# Patient Record
Sex: MALE | Race: WHITE
[De-identification: names, ages, dates, MRNs, and addresses within clinical notes are randomized per-mention and may not be internally consistent; named-entity substitution may affect disease eponyms.]

---

## 2020-11-16 ENCOUNTER — HOSPITAL ENCOUNTER (EMERGENCY)
Dept: HOSPITAL 9 - MADERS | Age: 45
Discharge: HOME | End: 2020-11-16
Payer: COMMERCIAL
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
## 2020-11-16 DIAGNOSIS — M25.572: Primary | ICD-10-CM

## 2020-11-16 NOTE — RAD
RADIOGRAPH LEFT ANKLE 3 VIEWS:



DATE:

11/16/2020



HISTORY:

45-year-old male with lateral left ankle pain for 2 weeks



FINDINGS:

Ankle mortise is congruent. There is no evidence of fracture. There is no subluxation or dislocation.
 There are no degenerative changes. Talar dome is maintained. There is lateral soft tissue

swelling.



IMPRESSION:



1. No osseous abnormality.

2. Edema at the lateral aspect of ankle



Reported By: Jb Meza 

Electronically Signed:  11/16/2020 7:06 PM

## 2020-11-30 ENCOUNTER — HOSPITAL ENCOUNTER (EMERGENCY)
Dept: HOSPITAL 9 - MADERS | Age: 45
Discharge: HOME | End: 2020-11-30
Payer: COMMERCIAL

## 2020-11-30 DIAGNOSIS — F17.210: ICD-10-CM

## 2020-11-30 DIAGNOSIS — J00: Primary | ICD-10-CM

## 2020-11-30 DIAGNOSIS — Z20.828: ICD-10-CM

## 2020-11-30 PROCEDURE — 99283 EMERGENCY DEPT VISIT LOW MDM: CPT

## 2020-11-30 PROCEDURE — U0003 INFECTIOUS AGENT DETECTION BY NUCLEIC ACID (DNA OR RNA); SEVERE ACUTE RESPIRATORY SYNDROME CORONAVIRUS 2 (SARS-COV-2) (CORONAVIRUS DISEASE [COVID-19]), AMPLIFIED PROBE TECHNIQUE, MAKING USE OF HIGH THROUGHPUT TECHNOLOGIES AS DESCRIBED BY CMS-2020-01-R: HCPCS

## 2020-11-30 PROCEDURE — 87635 SARS-COV-2 COVID-19 AMP PRB: CPT
